# Patient Record
Sex: FEMALE | Race: OTHER
[De-identification: names, ages, dates, MRNs, and addresses within clinical notes are randomized per-mention and may not be internally consistent; named-entity substitution may affect disease eponyms.]

---

## 2017-11-20 ENCOUNTER — HOSPITAL ENCOUNTER (EMERGENCY)
Dept: HOSPITAL 62 - ER | Age: 37
Discharge: HOME | End: 2017-11-20
Payer: COMMERCIAL

## 2017-11-20 VITALS — DIASTOLIC BLOOD PRESSURE: 65 MMHG | SYSTOLIC BLOOD PRESSURE: 111 MMHG

## 2017-11-20 DIAGNOSIS — R10.2: ICD-10-CM

## 2017-11-20 DIAGNOSIS — O26.892: Primary | ICD-10-CM

## 2017-11-20 DIAGNOSIS — R10.31: ICD-10-CM

## 2017-11-20 DIAGNOSIS — Z87.891: ICD-10-CM

## 2017-11-20 DIAGNOSIS — Z3A.15: ICD-10-CM

## 2017-11-20 LAB
ALBUMIN SERPL-MCNC: 3.8 G/DL (ref 3.5–5)
ALP SERPL-CCNC: 75 U/L (ref 38–126)
ALT SERPL-CCNC: 28 U/L (ref 9–52)
ANION GAP SERPL CALC-SCNC: 14 MMOL/L (ref 5–19)
APPEARANCE UR: CLEAR
AST SERPL-CCNC: 13 U/L (ref 14–36)
BASOPHILS # BLD AUTO: 0 10^3/UL (ref 0–0.2)
BASOPHILS NFR BLD AUTO: 0.4 % (ref 0–2)
BILIRUB DIRECT SERPL-MCNC: 0.2 MG/DL (ref 0–0.4)
BILIRUB SERPL-MCNC: 0.2 MG/DL (ref 0.2–1.3)
BILIRUB UR QL STRIP: NEGATIVE
BUN SERPL-MCNC: 7 MG/DL (ref 7–20)
CALCIUM: 9.3 MG/DL (ref 8.4–10.2)
CHLORIDE SERPL-SCNC: 101 MMOL/L (ref 98–107)
CO2 SERPL-SCNC: 24 MMOL/L (ref 22–30)
CREAT SERPL-MCNC: 0.54 MG/DL (ref 0.52–1.25)
EOSINOPHIL # BLD AUTO: 0.1 10^3/UL (ref 0–0.6)
EOSINOPHIL NFR BLD AUTO: 0.7 % (ref 0–6)
ERYTHROCYTE [DISTWIDTH] IN BLOOD BY AUTOMATED COUNT: 13.7 % (ref 11.5–14)
GLUCOSE SERPL-MCNC: 97 MG/DL (ref 75–110)
GLUCOSE UR STRIP-MCNC: NEGATIVE MG/DL
HCT VFR BLD CALC: 37.9 % (ref 36–47)
HGB BLD-MCNC: 12.8 G/DL (ref 12–15.5)
HGB HCT DIFFERENCE: 0.5
KETONES UR STRIP-MCNC: NEGATIVE MG/DL
LYMPHOCYTES # BLD AUTO: 3.1 10^3/UL (ref 0.5–4.7)
LYMPHOCYTES NFR BLD AUTO: 22.4 % (ref 13–45)
MCH RBC QN AUTO: 29.1 PG (ref 27–33.4)
MCHC RBC AUTO-ENTMCNC: 33.9 G/DL (ref 32–36)
MCV RBC AUTO: 86 FL (ref 80–97)
MONOCYTES # BLD AUTO: 0.7 10^3/UL (ref 0.1–1.4)
MONOCYTES NFR BLD AUTO: 4.9 % (ref 3–13)
NEUTROPHILS # BLD AUTO: 9.8 10^3/UL (ref 1.7–8.2)
NEUTS SEG NFR BLD AUTO: 71.6 % (ref 42–78)
NITRITE UR QL STRIP: NEGATIVE
PH UR STRIP: 7 [PH] (ref 5–9)
POTASSIUM SERPL-SCNC: 3.6 MMOL/L (ref 3.6–5)
PROT SERPL-MCNC: 6.7 G/DL (ref 6.3–8.2)
PROT UR STRIP-MCNC: NEGATIVE MG/DL
RBC # BLD AUTO: 4.41 10^6/UL (ref 3.72–5.28)
SODIUM SERPL-SCNC: 138.7 MMOL/L (ref 137–145)
SP GR UR STRIP: 1
UROBILINOGEN UR-MCNC: NEGATIVE MG/DL (ref ?–2)
WBC # BLD AUTO: 13.7 10^3/UL (ref 4–10.5)

## 2017-11-20 PROCEDURE — 36415 COLL VENOUS BLD VENIPUNCTURE: CPT

## 2017-11-20 PROCEDURE — 85025 COMPLETE CBC W/AUTO DIFF WBC: CPT

## 2017-11-20 PROCEDURE — 80053 COMPREHEN METABOLIC PANEL: CPT

## 2017-11-20 PROCEDURE — 99284 EMERGENCY DEPT VISIT MOD MDM: CPT

## 2017-11-20 PROCEDURE — 81001 URINALYSIS AUTO W/SCOPE: CPT

## 2017-11-20 PROCEDURE — 93976 VASCULAR STUDY: CPT

## 2017-11-20 PROCEDURE — 76805 OB US >/= 14 WKS SNGL FETUS: CPT

## 2017-11-20 NOTE — ER DOCUMENT REPORT
ED GI/





- General


Chief Complaint: RLQ abdominal pain


Stated Complaint: ABDOMINAL PAIN


Time Seen by Provider: 11/20/17 17:27


Mode of Arrival: Ambulatory


Information source: Patient


TRAVEL OUTSIDE OF THE [place] IN LAST 30 DAYS: No





- HPI


Patient complains to provider of: Pelvic pain, Pregnant


Onset: This morning


Timing/Duration: Gradual


Quality of pain: Sharp


Severity at maximum: Moderate


Severity in ED: Moderate


Pain Level: 3


Location: Pelvis


Exacerbated by: Denies


Relieved by: Denies


Similar symptoms previously: Yes


Recently seen / treated by doctor: No


Notes: 





11/20/17 22:25


Patient is a 36-year-old female who is approximately 15 weeks pregnant, 

presenting to the emergency room complaining of right-sided pelvic pain, she 

reports a history of ovarian cysts in the past and reports pain is similar, she 

denies any vaginal bleeding, no dysuria or hematuria, no fever or chills, no 

nausea, vomiting or diarrhea, she does have some milky white or clear discharge 

which she reports as being normal throughout previous pregnancy, she has been 

seen by [facility], has a history of a left oophorectomy for painful cyst





Past Medical History





- General


Information source: Patient





- Social History


Smoking Status: Former Smoker


Chew tobacco use (# tins/day): No


Frequency of alcohol use: None


Drug Abuse: None


Family History: Reviewed & Not Pertinent


Patient has suicidal ideation: No


Patient has homicidal ideation: No


Renal/ Medical History: Denies: Hx Peritoneal Dialysis


Past Surgical History: Reports: Hx Genitourinary Surgery - lt oopherectomy





Review of Systems





- Review of Systems


Constitutional: No symptoms reported


EENT: No symptoms reported


Cardiovascular: No symptoms reported


Respiratory: No symptoms reported


Gastrointestinal: No symptoms reported


Genitourinary: No symptoms reported


Female Genitourinary: See HPI


Musculoskeletal: No symptoms reported


Skin: No symptoms reported


Hematologic/Lymphatic: No symptoms reported


Neurological/Psychological: No symptoms reported


-: Yes All other systems reviewed and negative





Physical Exam





- Vital signs


Vitals: 


 











Temp Pulse Resp BP Pulse Ox


 


 98.3 F   87   16   115/78   100 


 


 11/20/17 16:27  11/20/17 16:27  11/20/17 16:27  11/20/17 16:27  11/20/17 16:27











Interpretation: Normal





- General


General appearance: Appears well, Alert





- HEENT


Head: Normocephalic, Atraumatic


Eyes: Normal


Pupils: PERRL





- Respiratory


Respiratory status: No respiratory distress


Chest status: Nontender


Breath sounds: Normal


Chest palpation: Normal





- Cardiovascular


Rhythm: Regular


Heart sounds: Normal auscultation


Murmur: No





- Abdominal


Inspection: Normal, Gravid female


Distension: No distension


Bowel sounds: Normal


Tenderness: Tender - Tender right adnexa


Organomegaly: No organomegaly





- Back


Back: Normal, Nontender





- Extremities


General upper extremity: Normal inspection, Nontender, Normal color, Normal ROM

, Normal temperature


General lower extremity: Normal inspection, Nontender, Normal color, Normal ROM

, Normal temperature, Normal weight bearing.  No: Trisha's sign





- Neurological


Neuro grossly intact: Yes


Cognition: Normal


Orientation: AAOx4


Ingrid Coma Scale Eye Opening: Spontaneous


Ingrid Coma Scale Verbal: Oriented


Ingrid Coma Scale Motor: Obeys Commands


Ingrid Coma Scale Total: 15


Speech: Normal


Motor strength normal: LUE, RUE, LLE, RLE


Sensory: Normal





- Psychological


Associated symptoms: Normal affect, Normal mood





- Skin


Skin Temperature: Warm


Skin Moisture: Dry


Skin Color: Normal





Course





- Re-evaluation


Re-evalutation: 





11/20/17 22:26


Lab and imaging findings were discussed with patient at bedside which are 

unremarkable, symptoms likely related to either ovarian cyst or round ligament 

pain, she was advised to take Tylenol, follow-up with her OB/GYN or return if 

symptoms worsen, patient acknowledges understanding and agreement with this plan





- Vital Signs


Vital signs: 


 











Temp Pulse Resp BP Pulse Ox


 


 98.1 F   74   20   111/65   98 


 


 11/20/17 20:52  11/20/17 20:52  11/20/17 20:52  11/20/17 20:52  11/20/17 20:52














- Laboratory


Result Diagrams: 


 11/20/17 17:41





 11/20/17 17:41


Laboratory results interpreted by me: 


 











  11/20/17 11/20/17





  17:41 17:41


 


WBC  13.7 H 


 


Absolute Neutrophils  9.8 H 


 


AST   13 L














- Diagnostic Test


Radiology reviewed: Image reviewed, Reports reviewed





Discharge





- Discharge


Clinical Impression: 


Pelvic pain affecting pregnancy


Qualifiers:


 Trimester: second trimester Qualified Code(s): O26.892 - Other specified 

pregnancy related conditions, second trimester





Condition: Stable


Disposition: HOME, SELF-CARE


Instructions:  Pelvic Pain in Pregnancy ([facility]), Pelvic Pain in Pregnancy and 

Round Ligament Pain ([facility])


Additional Instructions: 


Follow-up with her OB/GYN within the next week.  Take Tylenol as needed for 

pain.  Drink plenty of fluids.  Return to the emergency room immediately if 

symptoms worsen or any additional concerns.

## 2017-11-20 NOTE — RADIOLOGY REPORT (SQ)
EXAM DESCRIPTION:  U/S OB 14+ TA/1 GEST W/DOPPLER



COMPLETED DATE/TIME:  11/20/2017 8:54 pm



REASON FOR STUDY:  right adenexal pain



COMPARISON:  None.



TECHNIQUE:  Static and Dynamic grayscale imaging performed of gravid uterus using transabdominal appr
oach.  Additional selected color Doppler and spectral images recorded.  All stored on PACS.



LIMITATIONS:  None.



FINDINGS:  EGA: 15 weeks 1 day

BLANK: 5/13/2018

EFW: Not calculated grams

PERCENTILE: Not calculated

BETH: Largest pocket 4.4 cm

PLACENTA: Anterior  grade 1

FETAL PRESENTATION: Cephalic.

FETAL ANATOMY:

FETAL HEART RATE: 143 beats per minute.

FOUR CHAMBER HEART: Not visualized

THREE VESSEL CORD: Not visualize

CORD INSERTION: Not visualize

KIDNEYS AND BLADDER: Not visualized

STOMACH: Not visualize

SPINE: Normal as visualized.

BRAIN AND LATERAL VENTRICLES: Not visualized

OTHER: Upper and lower extremities were visualized.

MATERNAL ADNEXA: Maternal ovaries not visualized.

CERVICAL LENGTH: 2.8 cm   Closed.

OTHER: No other significant finding.



IMPRESSION:  LIVING INTRAUTERINE PREGNANCY.

ESTIMATED GESTATIONAL AGE 15 weeks 2 days

There was limited visualization of the fetal anatomy

Trimester of pregnancy: Second trimester - 13 weeks 1 day to 27 weeks 6 days.



TECHNICAL DOCUMENTATION:  JOB ID:  [id number]

 2011 [employer]- All Rights Reserved

## 2017-11-20 NOTE — ER DOCUMENT REPORT
ED Medical Screen (RME)





- General


Chief Complaint: RLQ abdominal pain


Stated Complaint: ABDOMINAL PAIN


Time Seen by Provider: 11/20/17 17:27


Notes: 





Patient presents with right adnexal/lower quadrant pain.  She states that she 

is currently 16 weeks pregnant.  She has had a normal ultrasound with this 

pregnancy already.  She states in the past she had similar pain in the left and 

had to have an oophorectomy.  She states she is concerned about her right 

ovary.  The pain is been constant since yesterday.  No vaginal bleeding.  She 

has had some vaginal discharge.  No problems with appetite.


TRAVEL OUTSIDE OF THE [place] IN LAST 30 DAYS: No





Past Medical History





- Social History


Chew tobacco use (# tins/day): No


Frequency of alcohol use: None


Drug Abuse: None


Renal/ Medical History: Denies: Hx Peritoneal Dialysis


Past Surgical History: Reports: Hx Genitourinary Surgery - lt oopherectomy





Physical Exam





- Vital signs


Vitals: 





 











Temp Pulse Resp BP Pulse Ox


 


 98.3 F   87   16   115/78   100 


 


 11/20/17 16:27  11/20/17 16:27  11/20/17 16:27  11/20/17 16:27  11/20/17 16:27














Course





- Vital Signs


Vital signs: 





 











Temp Pulse Resp BP Pulse Ox


 


 98.3 F   87   16   115/78   100 


 


 11/20/17 16:27  11/20/17 16:27  11/20/17 16:27  11/20/17 16:27  11/20/17 16:27

## 2017-11-30 ENCOUNTER — HOSPITAL ENCOUNTER (OUTPATIENT)
Dept: HOSPITAL 62 - LAB | Age: 37
End: 2017-11-30
Attending: OBSTETRICS & GYNECOLOGY
Payer: COMMERCIAL

## 2017-11-30 DIAGNOSIS — O09.519: Primary | ICD-10-CM

## 2017-11-30 PROCEDURE — 82105 ALPHA-FETOPROTEIN SERUM: CPT

## 2017-11-30 PROCEDURE — 36415 COLL VENOUS BLD VENIPUNCTURE: CPT

## 2018-04-13 ENCOUNTER — HOSPITAL ENCOUNTER (OUTPATIENT)
Dept: HOSPITAL 62 - SP | Age: 38
End: 2018-04-13
Attending: OBSTETRICS & GYNECOLOGY
Payer: COMMERCIAL

## 2018-04-13 DIAGNOSIS — R60.9: Primary | ICD-10-CM

## 2018-04-13 PROCEDURE — 93971 EXTREMITY STUDY: CPT

## 2018-04-13 NOTE — XCELERA REPORT
[address] [address]

                             Tel: [phone]

                             Fax: [phone]



                     Lower Extremity Venous Evaluation

____________________________________________________________________________



Name: AWAIS SANDY

MRN: [id number]                Age: 37 yrs

Gender: Female                 : 1980

Patient Status: Outpatient     Patient Location: 

Account #: [id number]

Study Date: 2018 11:55 AM

Accession #: [id number]

____________________________________________________________________________



Procedure: Color flow and duplex imaging of the veins of the right lower

extremity as well as the left Common Femoral vein.

Reason For Study: RLE EDEMA





Ordering Physician: SONG GAY

Performed By: Fletcher Wright

____________________________________________________________________________



____________________________________________________________________________





Right Sided Venous Evaluation

Normal vessel filling wall to wall, compression and augmentation as well as

Colour flow down to the infrageniculate veins.



Left Sided Venous Evaluation

The left common femoral vein is fully compressible. Spontaneous and phasic

flow is present in the left common femoral vein.

____________________________________________________________________________





Interpretation Summary

No duplex evidence of DVT or obstruction in the right lower extremity nor

in the left Common Femoral vein.

____________________________________________________________________________



____________________________________________________________________________



Electronically signed by:      Lennox Williams      on 2018 05:04 PM



CC: SONG GAY

>

Williams, Lennox

## 2019-03-29 ENCOUNTER — HOSPITAL ENCOUNTER (OUTPATIENT)
Dept: HOSPITAL 62 - LAB | Age: 39
End: 2019-03-29
Attending: FAMILY MEDICINE
Payer: COMMERCIAL

## 2019-03-29 DIAGNOSIS — Z83.3: Primary | ICD-10-CM

## 2019-03-29 DIAGNOSIS — Z01.419: ICD-10-CM

## 2019-03-29 DIAGNOSIS — Z83.438: ICD-10-CM

## 2019-03-29 LAB
ADD MANUAL DIFF: NO
ALBUMIN SERPL-MCNC: 3.9 G/DL (ref 3.5–5)
ALP SERPL-CCNC: 98 U/L (ref 38–126)
ALT SERPL-CCNC: 25 U/L (ref 9–52)
ANION GAP SERPL CALC-SCNC: 7 MMOL/L (ref 5–19)
AST SERPL-CCNC: 16 U/L (ref 14–36)
BASOPHILS # BLD AUTO: 0 10^3/UL (ref 0–0.2)
BASOPHILS NFR BLD AUTO: 0.7 % (ref 0–2)
BILIRUB DIRECT SERPL-MCNC: 0.1 MG/DL (ref 0–0.4)
BILIRUB SERPL-MCNC: 0.3 MG/DL (ref 0.2–1.3)
BUN SERPL-MCNC: 13 MG/DL (ref 7–20)
CALCIUM: 9.3 MG/DL (ref 8.4–10.2)
CHLORIDE SERPL-SCNC: 106 MMOL/L (ref 98–107)
CO2 SERPL-SCNC: 28 MMOL/L (ref 22–30)
EOSINOPHIL # BLD AUTO: 0.1 10^3/UL (ref 0–0.6)
EOSINOPHIL NFR BLD AUTO: 1.6 % (ref 0–6)
ERYTHROCYTE [DISTWIDTH] IN BLOOD BY AUTOMATED COUNT: 13.6 % (ref 11.5–14)
GLUCOSE SERPL-MCNC: 98 MG/DL (ref 75–110)
HCT VFR BLD CALC: 38.6 % (ref 36–47)
HGB BLD-MCNC: 13.3 G/DL (ref 12–15.5)
LDLC SERPL DIRECT ASSAY-MCNC: 111 MG/DL (ref ?–100)
LYMPHOCYTES # BLD AUTO: 2.8 10^3/UL (ref 0.5–4.7)
LYMPHOCYTES NFR BLD AUTO: 44.1 % (ref 13–45)
MCH RBC QN AUTO: 28.9 PG (ref 27–33.4)
MCHC RBC AUTO-ENTMCNC: 34.4 G/DL (ref 32–36)
MCV RBC AUTO: 84 FL (ref 80–97)
MONOCYTES # BLD AUTO: 0.5 10^3/UL (ref 0.1–1.4)
MONOCYTES NFR BLD AUTO: 8.3 % (ref 3–13)
NEUTROPHILS # BLD AUTO: 2.8 10^3/UL (ref 1.7–8.2)
NEUTS SEG NFR BLD AUTO: 45.3 % (ref 42–78)
PLATELET # BLD: 313 10^3/UL (ref 150–450)
POTASSIUM SERPL-SCNC: 4.1 MMOL/L (ref 3.6–5)
PROT SERPL-MCNC: 6.8 G/DL (ref 6.3–8.2)
RBC # BLD AUTO: 4.6 10^6/UL (ref 3.72–5.28)
SODIUM SERPL-SCNC: 140.9 MMOL/L (ref 137–145)
TOTAL CELLS COUNTED % (AUTO): 100 %
TRIGL SERPL-MCNC: 119 MG/DL (ref ?–150)
VLDLC SERPL CALC-MCNC: 24 MG/DL (ref 10–31)
WBC # BLD AUTO: 6.2 10^3/UL (ref 4–10.5)

## 2019-03-29 PROCEDURE — 85025 COMPLETE CBC W/AUTO DIFF WBC: CPT

## 2019-03-29 PROCEDURE — 80053 COMPREHEN METABOLIC PANEL: CPT

## 2019-03-29 PROCEDURE — 36415 COLL VENOUS BLD VENIPUNCTURE: CPT

## 2019-03-29 PROCEDURE — 80061 LIPID PANEL: CPT

## 2021-01-25 ENCOUNTER — HOSPITAL ENCOUNTER (OUTPATIENT)
Dept: HOSPITAL 62 - EMPHEALTH | Age: 41
End: 2021-01-25
Attending: INTERNAL MEDICINE
Payer: COMMERCIAL

## 2021-01-25 DIAGNOSIS — Z23: Primary | ICD-10-CM

## 2021-01-25 PROCEDURE — 91300: CPT
